# Patient Record
Sex: FEMALE
[De-identification: names, ages, dates, MRNs, and addresses within clinical notes are randomized per-mention and may not be internally consistent; named-entity substitution may affect disease eponyms.]

---

## 2024-10-31 ENCOUNTER — DASHBOARD ENCOUNTERS (OUTPATIENT)
Age: 54
End: 2024-10-31

## 2024-10-31 ENCOUNTER — TELEPHONE ENCOUNTER (OUTPATIENT)
Dept: URBAN - NONMETROPOLITAN AREA CLINIC 2 | Facility: CLINIC | Age: 54
End: 2024-10-31

## 2024-10-31 ENCOUNTER — OFFICE VISIT (OUTPATIENT)
Dept: URBAN - NONMETROPOLITAN AREA CLINIC 2 | Facility: CLINIC | Age: 54
End: 2024-10-31

## 2024-10-31 ENCOUNTER — LAB OUTSIDE AN ENCOUNTER (OUTPATIENT)
Dept: URBAN - NONMETROPOLITAN AREA CLINIC 2 | Facility: CLINIC | Age: 54
End: 2024-10-31

## 2024-10-31 VITALS
HEIGHT: 60 IN | DIASTOLIC BLOOD PRESSURE: 84 MMHG | HEART RATE: 70 BPM | SYSTOLIC BLOOD PRESSURE: 135 MMHG | TEMPERATURE: 97.8 F | BODY MASS INDEX: 33.96 KG/M2 | WEIGHT: 173 LBS

## 2024-10-31 PROBLEM — 48694002: Status: ACTIVE | Noted: 2024-10-31

## 2024-10-31 RX ORDER — BREXPIPRAZOLE 0.5 MG/1
TAKE 1 TABLET BY MOUTH ONCE DAILY WITH THE 1MG TABLET ORAL
Qty: 30 EACH | Refills: 1 | Status: ACTIVE | COMMUNITY

## 2024-10-31 RX ORDER — DEXTROMETHORPHAN HYDROBROMIDE, BUPROPION HYDROCHLORIDE 105; 45 MG/1; MG/1
TAKE 1 TABLET BY MOUTH TWICE DAILY AT LEAST 8 HOURS APART TABLET, MULTILAYER, EXTENDED RELEASE ORAL
Qty: 60 EACH | Refills: 0 | Status: ACTIVE | COMMUNITY

## 2024-10-31 RX ORDER — ALPRAZOLAM 0.5 MG/1
TAKE 1 TABLET BY MOUTH TWICE DAILY TABLET ORAL
Qty: 60 EACH | Refills: 0 | Status: ACTIVE | COMMUNITY

## 2024-10-31 RX ORDER — SOD SULF/POT CHLORIDE/MAG SULF 1.479 G
12 TABLETS THE FIRST DOSE THE EVENING BEFORE AND SECOND DOSE THE MORNING OF COLONOSCOPY TABLET ORAL TWICE A DAY
Qty: 24 | Refills: 0 | OUTPATIENT
Start: 2024-10-31 | End: 2024-11-01

## 2024-10-31 NOTE — HPI-TODAY'S VISIT:
Grace is a pleasant 54-year-old female who presents to discuss screening colonoscopy.  She is currently without any gastric complaints.  Does have a past medical history of depression.  No family history.  This will be her first colonoscopy sb

## 2024-11-05 ENCOUNTER — CLAIMS CREATED FROM THE CLAIM WINDOW (OUTPATIENT)
Dept: URBAN - NONMETROPOLITAN AREA SURGERY CENTER 1 | Facility: SURGERY CENTER | Age: 54
End: 2024-11-05
Payer: COMMERCIAL

## 2024-11-05 DIAGNOSIS — Z12.11 COLON CANCER SCREENING: ICD-10-CM

## 2024-11-05 DIAGNOSIS — Z12.11 ENCOUNTER SCREENING FOR MALIGNANT NEOPLASM OF COLON: ICD-10-CM

## 2024-11-05 DIAGNOSIS — K57.30 DIVERTICULOSIS OF SIGMOID COLON: ICD-10-CM

## 2024-11-05 PROCEDURE — 0528F RCMND FLW-UP 10 YRS DOCD: CPT | Performed by: INTERNAL MEDICINE

## 2024-11-05 PROCEDURE — 00812 ANES LWR INTST SCR COLSC: CPT | Performed by: NURSE ANESTHETIST, CERTIFIED REGISTERED

## 2024-11-05 PROCEDURE — G0121 COLON CA SCRN NOT HI RSK IND: HCPCS | Performed by: INTERNAL MEDICINE

## 2024-11-05 RX ORDER — ALPRAZOLAM 0.5 MG/1
TAKE 1 TABLET BY MOUTH TWICE DAILY TABLET ORAL
Qty: 60 EACH | Refills: 0 | Status: ACTIVE | COMMUNITY

## 2024-11-05 RX ORDER — BREXPIPRAZOLE 0.5 MG/1
TAKE 1 TABLET BY MOUTH ONCE DAILY WITH THE 1MG TABLET ORAL
Qty: 30 EACH | Refills: 1 | Status: ACTIVE | COMMUNITY

## 2024-11-05 RX ORDER — DEXTROMETHORPHAN HYDROBROMIDE, BUPROPION HYDROCHLORIDE 105; 45 MG/1; MG/1
TAKE 1 TABLET BY MOUTH TWICE DAILY AT LEAST 8 HOURS APART TABLET, MULTILAYER, EXTENDED RELEASE ORAL
Qty: 60 EACH | Refills: 0 | Status: ACTIVE | COMMUNITY